# Patient Record
Sex: FEMALE | Race: WHITE | NOT HISPANIC OR LATINO | URBAN - METROPOLITAN AREA
[De-identification: names, ages, dates, MRNs, and addresses within clinical notes are randomized per-mention and may not be internally consistent; named-entity substitution may affect disease eponyms.]

---

## 2024-01-18 DIAGNOSIS — K62.3 RECTAL PROLAPSE: Primary | ICD-10-CM

## 2024-01-23 ENCOUNTER — OFFICE VISIT (OUTPATIENT)
Dept: SURGERY | Facility: CLINIC | Age: 64
End: 2024-01-23
Payer: MEDICAID

## 2024-01-23 VITALS
HEIGHT: 63 IN | DIASTOLIC BLOOD PRESSURE: 79 MMHG | RESPIRATION RATE: 20 BRPM | TEMPERATURE: 98 F | OXYGEN SATURATION: 99 % | BODY MASS INDEX: 26.75 KG/M2 | WEIGHT: 151 LBS | SYSTOLIC BLOOD PRESSURE: 120 MMHG | HEART RATE: 82 BPM

## 2024-01-23 DIAGNOSIS — K62.3 RECTAL PROLAPSE: Primary | ICD-10-CM

## 2024-01-23 PROCEDURE — 99215 OFFICE O/P EST HI 40 MIN: CPT | Mod: PBBFAC

## 2024-01-23 RX ORDER — ALBUTEROL SULFATE 90 UG/1
AEROSOL, METERED RESPIRATORY (INHALATION)
COMMUNITY
Start: 2023-12-29

## 2024-01-23 RX ORDER — MELOXICAM 15 MG/1
15 TABLET ORAL DAILY PRN
COMMUNITY

## 2024-01-23 RX ORDER — ATORVASTATIN CALCIUM 20 MG/1
20 TABLET, FILM COATED ORAL NIGHTLY
COMMUNITY

## 2024-01-23 RX ORDER — LEVOTHYROXINE SODIUM 125 UG/1
125 TABLET ORAL EVERY MORNING
COMMUNITY

## 2024-01-23 RX ORDER — HYDROCHLOROTHIAZIDE 12.5 MG/1
TABLET ORAL
COMMUNITY

## 2024-01-23 RX ORDER — DICYCLOMINE HYDROCHLORIDE 20 MG/1
1 TABLET ORAL
COMMUNITY

## 2024-01-23 RX ORDER — DULOXETIN HYDROCHLORIDE 60 MG/1
60 CAPSULE, DELAYED RELEASE ORAL
COMMUNITY

## 2024-01-23 RX ORDER — LUBIPROSTONE 24 UG/1
24 CAPSULE, GELATIN COATED ORAL 2 TIMES DAILY
COMMUNITY

## 2024-01-23 NOTE — PROGRESS NOTES
[facility] General Surgery Clinic Note    HPI: Ms. Amado Pena is a 63 y.o. F presenting for rectal prolapse. She states she noticed worsening prolapse for the past 1.5 months. She has a long history of constipation and states that she strains with each bowel movement and has incomplete evacuation. She notices bleeding occasionally on wiping, but denies any pain. She uses stool softeners and Amitiza to help with bowel movements. She notices increased frequency of urination in the past month. Denies any incontinence at this time. She states rectal mucous discharge that requires her to wear a liner.  She states her last bowel movement was this morning, but she tends to have one every couple of days. She does not take any fiber supplements at this time, but has been doing Kegel exercises.     Her past surgical history includes cervical spine surgery, tonsillectomy, a tubal ligation, and bilateral carpal tunnel release. She has had 3 children vaginally delivered, with a history of second degree tearing and a history of two episiotomies. Her last colonoscopy was 1.5 -2 years ago with Dr. Childress.     PMH:   Past Medical History:   Diagnosis Date    Hypertension     Hypothyroidism       Meds:   Current Outpatient Medications:     albuterol (PROVENTIL/VENTOLIN HFA) 90 mcg/actuation inhaler, SMARTSI-2 Puff(s) By Mouth Every 4-6 Hours PRN, Disp: , Rfl:     AMITIZA 24 mcg Cap, Take 24 mcg by mouth 2 (two) times daily., Disp: , Rfl:     atorvastatin (LIPITOR) 20 MG tablet, Take 20 mg by mouth every evening., Disp: , Rfl:     dicyclomine (BENTYL) 20 mg tablet, 1 tablet., Disp: , Rfl:     DULoxetine (CYMBALTA) 60 MG capsule, Take 60 mg by mouth., Disp: , Rfl:     hydroCHLOROthiazide (HYDRODIURIL) 12.5 MG Tab, , Disp: , Rfl:     levothyroxine (SYNTHROID) 125 MCG tablet, Take 125 mcg by mouth every morning., Disp: , Rfl:     meloxicam (MOBIC) 15 MG tablet, Take 15 mg by mouth daily as needed., Disp: , Rfl:   Allergies:   Review of  patient's allergies indicates:   Allergen Reactions    Hydrocodone Itching     Social History:   Social History     Tobacco Use    Smoking status: Never    Smokeless tobacco: Never     Family History:   Family History   Problem Relation Age of Onset    Diabetes Mother     Progressive Supranuclear Palsy Father     Parkinsonism Father     Brain aneurysm Sister     Cancer Brother      Surgical History:   Past Surgical History:   Procedure Laterality Date    CARPAL TUNNEL RELEASE Bilateral     CERVICAL SPINE SURGERY      TONSILLECTOMY      TUBAL LIGATION       Review of Systems:  Skin: No rashes or itching.  Head: Denies headache or recent trauma.  Eyes: Denies eye pain or double vision.  Neck: Denies swelling or hoarseness of voice.  Respiratory: Denies shortness of breath or chest pain  Cardiac: Denies palpitations or swelling in hands/feet.  Gastrointestinal: Denies nausea, denies vomiting. Has Constipation.  Urinary: Denies dysuria or hematuria.  Vascular: Denies claudication or leg swelling.  Neuro: Denies motor deficits. Denies weakness.  Endocrine: Denies excessive sweating or cold intolerance.  Psych: Denies memory problems. Denies anxiety.    Objective:    Vitals:  Vitals:    01/23/24 1402   BP: 120/79   Pulse: 82   Resp: 20   Temp: 98.4 °F (36.9 °C)        Physical Exam:  Gen: NAD  Neuro: awake, alert, answering questions appropriately  CV: RRR  Resp: non-labored breathing, ANDREA  Abd: soft, ND, NT  : skin tag, Rectal prolapse with rectocele, attenuated sphincter tone  Ext: moves all 4 spontaneously and purposefully  Skin: warm, well perfused    Pertinent Labs:  None.    Imaging:  MRI defecography ordered, pending results    Micro/Path/Other:  None.    Assessment/Plan:  Ms. Amado Pena is a 63 y.o. F presenting for 1.5 month history of rectal prolapse.    -[facility], will discuss results in 3 weeks on follow up appointment  -Patient counseled on the importance  of fiber supplementation  -Patient counseled on laparoscopic rectopexy and possible recurrence rate of prolapse with surgery      RTC, 3 weeks    Diane Gonzalez, MS3  [facility] [employer]   01/23/2024 2:43 PM         Addendum  Patient seen and examined.  Agree with above, edited as needed.    63-year-old female presents to clinic for evaluation of rectal prolapse.  Says that she has noticed it for the past month and a half. Incontinent to gas, wears pads due to concern for possible incontinence/mucoid output, affecting her daily life. Wexner score 10 (+3 gas, +4 wears pad daily, +3 lifestyle alteration).    History of 3 vaginal births.  First child with what sounds like grade 2 versus grade 3 vaginal tear.  Episiotomy for the subsequent 2 children.    Denies any urinary incontinence.  Denies having to stent to urinate.  Has not noticed any vaginal or uterine prolapse.    Last colonoscopy 1.5 years ago, told to return in 5 years.    On exam, unable to recreate prolapse.  Posterior right skin tag with no evidence of hemorrhoidal engorgement.  No other external abnormalities.  Tone present but mildly decreased.  Poor squeeze, good relaxation.  Mild inconsistency in anterior sphincter appreciated on palpation.  Concern for possible rectocele on exam.  Prolapse again unable to be recreated after exam.     Patient will photo on her phone of prolapse, consistent with grade 3 prolapse.    -Given patient's history and possible rectocele on exam, we will refer her for MRI defecography to evaluate for any concurrent pelvic organ prolapse.  -Return to clinic in 3 weeks   -If no other pelvic organ prolapse, we will schedule for a likely laparoscopic rectopexy and next visit  -discussed fiber supplementation to help with straining with bowel movements and constipation    Librado Harrell MD  PGY-6, [facility] General Surgery  01/23/2024

## 2024-01-23 NOTE — PROGRESS NOTES
Seen by Dr. Harrell.  MRI Defecography ordered to be done at [facility].  Contact info for [facility] and MRI order given to patient.  RTC 3weeks.

## 2024-01-24 NOTE — PROGRESS NOTES
I have reviewed the notes, assessments, and/or procedures performed by the resident, I concur with her/his documentation of Amado Pena.     Jenise Ortiz MD

## 2024-02-15 ENCOUNTER — OFFICE VISIT (OUTPATIENT)
Dept: SURGERY | Facility: CLINIC | Age: 64
End: 2024-02-15
Payer: MEDICAID

## 2024-02-15 VITALS
WEIGHT: 146 LBS | SYSTOLIC BLOOD PRESSURE: 146 MMHG | DIASTOLIC BLOOD PRESSURE: 84 MMHG | HEIGHT: 62 IN | RESPIRATION RATE: 18 BRPM | HEART RATE: 73 BPM | BODY MASS INDEX: 26.87 KG/M2 | OXYGEN SATURATION: 98 % | TEMPERATURE: 98 F

## 2024-02-15 DIAGNOSIS — K62.3 RECTAL PROLAPSE: Primary | ICD-10-CM

## 2024-02-15 PROCEDURE — 99214 OFFICE O/P EST MOD 30 MIN: CPT | Mod: PBBFAC

## 2024-02-15 NOTE — PROGRESS NOTES
[facility] General Surgery Clinic Follow-Up Note    Subjective  Ms. Amado Pena is a 63 y.o. F presenting for follow-up visit for rectal prolapse. She states she noticed worsening prolapse for the past 1.5 months. She has a long history of constipation and states that she strains with each bowel movement and has incomplete evacuation. She notices bleeding occasionally on wiping, but denies any pain. She uses stool softeners and Amitiza to help with bowel movements. She notices increased frequency of urination in the past month. Denies any incontinence at this time. She states rectal mucous discharge that requires her to wear a liner.  She states her last bowel movement was this morning, but she tends to have one every couple of days. She does not take any fiber supplements at this time, but has been doing Kegel exercises. Her past surgical history includes cervical spine surgery, tonsillectomy, a tubal ligation, and bilateral carpal tunnel release. She has had 3 children vaginally delivered, with a history of second degree tearing and a history of two episiotomies. Her last colonoscopy was 1.5 -2 years ago with Dr. Childress.    Interval history: urinary urgency and prolapse symptoms remain the same. Difficult doing strenuous activity and frequent discomfort. She is eager for some relief and has many questions about possible surgery. She got her MRI defecography which demonstrates severe posterior compartment pelvic floor laxity, with anterior rectocele.     Objective  Gen: NAD, AAOx3  CV: extremities wwp, regular rate, palpable radials  Pulm: nonlabored, no increased wob, equal chest rise b/l   Abd: s/nt/nd  Anorectal: deferred this visit    Imaging:   MRI defecography:  Posterior fusion hardware is present in the lumbar spine.     Rest:     Pubococcygeal line to bladder neck: 2.3 cm     Pubococcygeal line to cervix: 4.9 cm     Pubococcygeal line to anal rectal junction: 3.4 cm     H line: 5 cm (normal less than 5 cm)     M  line: 3.4 cm (normal less than 2 cm     Levator plate angle: 32 degree caudal tilt relative to the pubococcygeal line (normal parallel)     Valsalva: Minimal descent of the posterior compartment.     Kegel: Mild pelvic floor lift.     Evacuation:        Pubococcygeal line to bladder neck:-1 cm     Pubococcygeal line to cervix: 1.2 cm l     Pubococcygeal line to anal rectal junction: 7.7 cm     Rectal gel is successfully expelled with evacuation.  There is excessive descent of the posterior compartment with evacuation.  The anterior rectal wall bulges anteriorly with evacuation by approximately 3 cm.  The pouch of Kiko widens with mesenteric fat with Valsalva, but no enterocele is appreciated.     Impression:  Severe posterior compartment pelvic floor laxity, with anterior rectocele.    A/P:  63 y.o. female with rectal prolapse/rectopexy, s/p MRI defecography showing severe pelvic floor laxity. Symptoms are lifestyle limiting.    -given MRI findings will refer to colorectal surgery in [place]. Discussed with patient and her  who are in agreement with plan.  -continue fiber/water intake    Theresa Vega MD  [employer] General Surgery [facility]

## 2024-02-15 NOTE — PROGRESS NOTES
Patient seen by Dr. DELVIS Vega will refer to [facility] in [place]. Will return prn. Written and verbal discharge instructions given.